# Patient Record
Sex: FEMALE | Race: WHITE | NOT HISPANIC OR LATINO | URBAN - METROPOLITAN AREA
[De-identification: names, ages, dates, MRNs, and addresses within clinical notes are randomized per-mention and may not be internally consistent; named-entity substitution may affect disease eponyms.]

---

## 2019-04-05 ENCOUNTER — OFFICE VISIT (OUTPATIENT)
Dept: URBAN - METROPOLITAN AREA CLINIC 48 | Facility: CLINIC | Age: 54
End: 2019-04-05
Payer: COMMERCIAL

## 2019-04-05 DIAGNOSIS — H35.3131 NEXDTVE AGE-RELATED MCLR DEGN, BILATERAL, EARLY DRY STAGE: Primary | ICD-10-CM

## 2019-04-05 PROCEDURE — 92134 CPTRZ OPH DX IMG PST SGM RTA: CPT | Performed by: OPHTHALMOLOGY

## 2019-04-05 PROCEDURE — 99213 OFFICE O/P EST LOW 20 MIN: CPT | Performed by: OPHTHALMOLOGY

## 2019-04-05 ASSESSMENT — INTRAOCULAR PRESSURE
OS: 14
OD: 14

## 2019-04-05 NOTE — IMPRESSION/PLAN
Impression: Nexdtve age-related mclr degn, bilateral, early dry stage: H35.3131. Tony Lancaster, unlikely age related Plan: OCT ordered and performed today. Discussed diagnosis with patient, OCT demonstrates geographic atrophy. Treatment is not recommended at this time but we will continue to monitor frequently. The patient was advised to monitor her vision daily, monitor vision one eye at a time. Call immediately with any vision changes. Recommend low vision consult with Dr. Delmar Goldman. Patient agrees with plan.

## 2020-08-07 ENCOUNTER — OFFICE VISIT (OUTPATIENT)
Dept: URBAN - METROPOLITAN AREA CLINIC 48 | Facility: CLINIC | Age: 55
End: 2020-08-07
Payer: COMMERCIAL

## 2020-08-07 DIAGNOSIS — H35.52 PIGMENTARY RETINAL DYSTROPHY: Primary | ICD-10-CM

## 2020-08-07 PROCEDURE — 99213 OFFICE O/P EST LOW 20 MIN: CPT | Performed by: OPHTHALMOLOGY

## 2020-08-07 PROCEDURE — 92134 CPTRZ OPH DX IMG PST SGM RTA: CPT | Performed by: OPHTHALMOLOGY

## 2020-08-07 ASSESSMENT — INTRAOCULAR PRESSURE
OS: 18
OD: 16

## 2020-08-07 NOTE — IMPRESSION/PLAN
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 Discussed diagnosis with patient, OCT demonstrates the lack of CME. Advised patient there is no treatment currently for Best's disease at this time. Patient to continue to check her vision one eye at a time daily while reading. Patient to call our office if vision worsens. Patient understands and agrees with the plan.

## 2024-01-23 ENCOUNTER — OFFICE VISIT (OUTPATIENT)
Dept: URBAN - METROPOLITAN AREA CLINIC 48 | Facility: CLINIC | Age: 59
End: 2024-01-23
Payer: MEDICARE

## 2024-01-23 DIAGNOSIS — H35.52 PIGMENTARY RETINAL DYSTROPHY: Primary | ICD-10-CM

## 2024-01-23 DIAGNOSIS — H25.13 AGE-RELATED NUCLEAR CATARACT, BILATERAL: ICD-10-CM

## 2024-01-23 PROCEDURE — 92134 CPTRZ OPH DX IMG PST SGM RTA: CPT | Performed by: OPHTHALMOLOGY

## 2024-01-23 PROCEDURE — 92004 COMPRE OPH EXAM NEW PT 1/>: CPT | Performed by: OPHTHALMOLOGY

## 2024-01-23 ASSESSMENT — INTRAOCULAR PRESSURE
OS: 19
OD: 13

## 2024-05-20 ENCOUNTER — TECH ONLY (OUTPATIENT)
Dept: URBAN - METROPOLITAN AREA CLINIC 48 | Facility: CLINIC | Age: 59
End: 2024-05-20
Payer: MEDICARE

## 2024-05-20 DIAGNOSIS — H25.13 AGE-RELATED NUCLEAR CATARACT, BILATERAL: Primary | ICD-10-CM

## 2024-05-20 ASSESSMENT — PACHYMETRY
OS: 2.81
OD: 2.62
OS: 21.58
OD: 21.57

## 2024-05-20 ASSESSMENT — KERATOMETRY
OS: 48.02
OD: 48.09

## 2024-06-13 ENCOUNTER — PROCEDURE (OUTPATIENT)
Dept: URBAN - METROPOLITAN AREA SURGERY 24 | Facility: SURGERY | Age: 59
End: 2024-06-13
Payer: MEDICARE

## 2024-06-13 ENCOUNTER — Encounter (OUTPATIENT)
Dept: URBAN - METROPOLITAN AREA SURGERY 25 | Facility: SURGERY | Age: 59
End: 2024-06-13
Payer: MEDICARE

## 2024-06-13 PROCEDURE — 66984 XCAPSL CTRC RMVL W/O ECP: CPT | Performed by: STUDENT IN AN ORGANIZED HEALTH CARE EDUCATION/TRAINING PROGRAM

## 2024-06-14 ENCOUNTER — POST-OPERATIVE VISIT (OUTPATIENT)
Dept: URBAN - METROPOLITAN AREA CLINIC 48 | Facility: CLINIC | Age: 59
End: 2024-06-14
Payer: MEDICARE

## 2024-06-14 DIAGNOSIS — Z48.810 ENCOUNTER FOR SURGICAL AFTERCARE FOLLOWING SURGERY ON A SENSE ORGAN: Primary | ICD-10-CM

## 2024-06-14 PROCEDURE — 99024 POSTOP FOLLOW-UP VISIT: CPT | Performed by: STUDENT IN AN ORGANIZED HEALTH CARE EDUCATION/TRAINING PROGRAM

## 2024-06-14 ASSESSMENT — INTRAOCULAR PRESSURE: OD: 15

## 2024-06-20 ENCOUNTER — POST-OPERATIVE VISIT (OUTPATIENT)
Dept: URBAN - METROPOLITAN AREA CLINIC 48 | Facility: CLINIC | Age: 59
End: 2024-06-20
Payer: MEDICARE

## 2024-06-20 DIAGNOSIS — Z48.810 ENCOUNTER FOR SURGICAL AFTERCARE FOLLOWING SURGERY ON A SENSE ORGAN: Primary | ICD-10-CM

## 2024-06-20 PROCEDURE — 99024 POSTOP FOLLOW-UP VISIT: CPT | Performed by: STUDENT IN AN ORGANIZED HEALTH CARE EDUCATION/TRAINING PROGRAM

## 2024-06-20 ASSESSMENT — INTRAOCULAR PRESSURE: OD: 18

## 2024-06-27 ENCOUNTER — Encounter (OUTPATIENT)
Dept: URBAN - METROPOLITAN AREA SURGERY 25 | Facility: SURGERY | Age: 59
End: 2024-06-27
Payer: MEDICARE

## 2024-06-27 ENCOUNTER — PROCEDURE (OUTPATIENT)
Dept: URBAN - METROPOLITAN AREA SURGERY 24 | Facility: SURGERY | Age: 59
End: 2024-06-27
Payer: MEDICARE

## 2024-06-27 DIAGNOSIS — H21.81 FLOPPY IRIS SYNDROME: Primary | ICD-10-CM

## 2024-06-27 PROCEDURE — 66984 XCAPSL CTRC RMVL W/O ECP: CPT | Performed by: STUDENT IN AN ORGANIZED HEALTH CARE EDUCATION/TRAINING PROGRAM

## 2024-06-28 ENCOUNTER — POST-OPERATIVE VISIT (OUTPATIENT)
Dept: URBAN - METROPOLITAN AREA CLINIC 48 | Facility: CLINIC | Age: 59
End: 2024-06-28
Payer: MEDICARE

## 2024-06-28 DIAGNOSIS — Z96.1 PRESENCE OF INTRAOCULAR LENS: Primary | ICD-10-CM

## 2024-06-28 PROCEDURE — 99024 POSTOP FOLLOW-UP VISIT: CPT | Performed by: STUDENT IN AN ORGANIZED HEALTH CARE EDUCATION/TRAINING PROGRAM

## 2024-06-28 ASSESSMENT — INTRAOCULAR PRESSURE
OS: 18
OD: 16

## 2024-07-05 ENCOUNTER — POST-OPERATIVE VISIT (OUTPATIENT)
Dept: URBAN - METROPOLITAN AREA CLINIC 48 | Facility: CLINIC | Age: 59
End: 2024-07-05
Payer: MEDICARE

## 2024-07-05 DIAGNOSIS — Z48.810 ENCOUNTER FOR SURGICAL AFTERCARE FOLLOWING SURGERY ON A SENSE ORGAN: Primary | ICD-10-CM

## 2024-07-05 PROCEDURE — 99024 POSTOP FOLLOW-UP VISIT: CPT | Performed by: OPHTHALMOLOGY

## 2024-07-05 ASSESSMENT — INTRAOCULAR PRESSURE: OS: 17

## 2024-07-26 ENCOUNTER — POST-OPERATIVE VISIT (OUTPATIENT)
Dept: URBAN - METROPOLITAN AREA CLINIC 48 | Facility: CLINIC | Age: 59
End: 2024-07-26
Payer: MEDICARE

## 2024-07-26 DIAGNOSIS — Z96.1 PRESENCE OF INTRAOCULAR LENS: Primary | ICD-10-CM

## 2024-07-26 PROCEDURE — 99024 POSTOP FOLLOW-UP VISIT: CPT | Performed by: STUDENT IN AN ORGANIZED HEALTH CARE EDUCATION/TRAINING PROGRAM

## 2024-07-26 ASSESSMENT — INTRAOCULAR PRESSURE
OD: 12
OS: 14